# Patient Record
Sex: MALE | Race: BLACK OR AFRICAN AMERICAN | NOT HISPANIC OR LATINO | URBAN - METROPOLITAN AREA
[De-identification: names, ages, dates, MRNs, and addresses within clinical notes are randomized per-mention and may not be internally consistent; named-entity substitution may affect disease eponyms.]

---

## 2021-10-19 ENCOUNTER — INPATIENT (INPATIENT)
Facility: HOSPITAL | Age: 66
LOS: 1 days | Discharge: HOME | DRG: 69 | End: 2021-10-21
Attending: INTERNAL MEDICINE | Admitting: INTERNAL MEDICINE
Payer: MEDICARE

## 2021-10-19 VITALS
SYSTOLIC BLOOD PRESSURE: 138 MMHG | WEIGHT: 179.9 LBS | DIASTOLIC BLOOD PRESSURE: 77 MMHG | RESPIRATION RATE: 18 BRPM | HEART RATE: 96 BPM | TEMPERATURE: 98 F | OXYGEN SATURATION: 91 %

## 2021-10-19 DIAGNOSIS — E11.9 TYPE 2 DIABETES MELLITUS WITHOUT COMPLICATIONS: ICD-10-CM

## 2021-10-19 DIAGNOSIS — Z29.9 ENCOUNTER FOR PROPHYLACTIC MEASURES, UNSPECIFIED: ICD-10-CM
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
## 2021-10-19 DIAGNOSIS — I10 ESSENTIAL (PRIMARY) HYPERTENSION: ICD-10-CM

## 2021-10-19 DIAGNOSIS — F17.200 NICOTINE DEPENDENCE, UNSPECIFIED, UNCOMPLICATED: ICD-10-CM

## 2021-10-19 DIAGNOSIS — G93.49 OTHER ENCEPHALOPATHY: ICD-10-CM

## 2021-10-19 DIAGNOSIS — Z92.89 PERSONAL HISTORY OF OTHER MEDICAL TREATMENT: ICD-10-CM

## 2021-10-19 LAB
ALBUMIN SERPL ELPH-MCNC: 3.6 G/DL — SIGNIFICANT CHANGE UP (ref 3.4–5)
ALP SERPL-CCNC: 102 U/L — SIGNIFICANT CHANGE UP (ref 40–120)
ALT FLD-CCNC: 21 U/L — SIGNIFICANT CHANGE UP (ref 12–42)
ANION GAP SERPL CALC-SCNC: 5 MMOL/L — LOW (ref 9–16)
APPEARANCE UR: CLEAR — SIGNIFICANT CHANGE UP
AST SERPL-CCNC: 14 U/L — LOW (ref 15–37)
BASOPHILS # BLD AUTO: 0.06 K/UL — SIGNIFICANT CHANGE UP (ref 0–0.2)
BASOPHILS NFR BLD AUTO: 0.7 % — SIGNIFICANT CHANGE UP (ref 0–2)
BILIRUB SERPL-MCNC: 0.3 MG/DL — SIGNIFICANT CHANGE UP (ref 0.2–1.2)
BILIRUB UR-MCNC: NEGATIVE — SIGNIFICANT CHANGE UP
BUN SERPL-MCNC: 11 MG/DL — SIGNIFICANT CHANGE UP (ref 7–23)
CALCIUM SERPL-MCNC: 9.4 MG/DL — SIGNIFICANT CHANGE UP (ref 8.5–10.5)
CHLORIDE SERPL-SCNC: 110 MMOL/L — HIGH (ref 96–108)
CK SERPL-CCNC: 74 U/L — SIGNIFICANT CHANGE UP (ref 39–308)
CO2 SERPL-SCNC: 29 MMOL/L — SIGNIFICANT CHANGE UP (ref 22–31)
COLOR SPEC: YELLOW — SIGNIFICANT CHANGE UP
CREAT SERPL-MCNC: 1.21 MG/DL — SIGNIFICANT CHANGE UP (ref 0.5–1.3)
DIFF PNL FLD: NEGATIVE — SIGNIFICANT CHANGE UP
EOSINOPHIL # BLD AUTO: 0.02 K/UL — SIGNIFICANT CHANGE UP (ref 0–0.5)
EOSINOPHIL NFR BLD AUTO: 0.2 % — SIGNIFICANT CHANGE UP (ref 0–6)
GLUCOSE SERPL-MCNC: 103 MG/DL — HIGH (ref 70–99)
GLUCOSE UR QL: NEGATIVE — SIGNIFICANT CHANGE UP
HCT VFR BLD CALC: 48 % — SIGNIFICANT CHANGE UP (ref 39–50)
HGB BLD-MCNC: 15 G/DL — SIGNIFICANT CHANGE UP (ref 13–17)
IMM GRANULOCYTES NFR BLD AUTO: 0.3 % — SIGNIFICANT CHANGE UP (ref 0–1.5)
KETONES UR-MCNC: NEGATIVE — SIGNIFICANT CHANGE UP
LEUKOCYTE ESTERASE UR-ACNC: NEGATIVE — SIGNIFICANT CHANGE UP
LYMPHOCYTES # BLD AUTO: 1.84 K/UL — SIGNIFICANT CHANGE UP (ref 1–3.3)
LYMPHOCYTES # BLD AUTO: 21.4 % — SIGNIFICANT CHANGE UP (ref 13–44)
MANUAL SMEAR VERIFICATION: SIGNIFICANT CHANGE UP
MCHC RBC-ENTMCNC: 27 PG — SIGNIFICANT CHANGE UP (ref 27–34)
MCHC RBC-ENTMCNC: 31.3 GM/DL — LOW (ref 32–36)
MCV RBC AUTO: 86.5 FL — SIGNIFICANT CHANGE UP (ref 80–100)
MONOCYTES # BLD AUTO: 0.74 K/UL — SIGNIFICANT CHANGE UP (ref 0–0.9)
MONOCYTES NFR BLD AUTO: 8.6 % — SIGNIFICANT CHANGE UP (ref 2–14)
NEUTROPHILS # BLD AUTO: 5.89 K/UL — SIGNIFICANT CHANGE UP (ref 1.8–7.4)
NEUTROPHILS NFR BLD AUTO: 68.8 % — SIGNIFICANT CHANGE UP (ref 43–77)
NITRITE UR-MCNC: NEGATIVE — SIGNIFICANT CHANGE UP
NRBC # BLD: 0 /100 WBCS — SIGNIFICANT CHANGE UP (ref 0–0)
PH UR: 5.5 — SIGNIFICANT CHANGE UP (ref 5–8)
PLAT MORPH BLD: NORMAL — SIGNIFICANT CHANGE UP
PLATELET # BLD AUTO: 137 K/UL — LOW (ref 150–400)
POTASSIUM SERPL-MCNC: 4.1 MMOL/L — SIGNIFICANT CHANGE UP (ref 3.5–5.3)
POTASSIUM SERPL-SCNC: 4.1 MMOL/L — SIGNIFICANT CHANGE UP (ref 3.5–5.3)
PROT SERPL-MCNC: 7.3 G/DL — SIGNIFICANT CHANGE UP (ref 6.4–8.2)
PROT UR-MCNC: NEGATIVE MG/DL — SIGNIFICANT CHANGE UP
RBC # BLD: 5.55 M/UL — SIGNIFICANT CHANGE UP (ref 4.2–5.8)
RBC # FLD: 14.6 % — HIGH (ref 10.3–14.5)
RBC BLD AUTO: NORMAL — SIGNIFICANT CHANGE UP
SARS-COV-2 RNA SPEC QL NAA+PROBE: SIGNIFICANT CHANGE UP
SODIUM SERPL-SCNC: 144 MMOL/L — SIGNIFICANT CHANGE UP (ref 132–145)
SP GR SPEC: <=1.005 — SIGNIFICANT CHANGE UP (ref 1–1.03)
TROPONIN I, HIGH SENSITIVITY RESULT: 16 NG/L — SIGNIFICANT CHANGE UP
UROBILINOGEN FLD QL: 0.2 E.U./DL — SIGNIFICANT CHANGE UP
WBC # BLD: 8.58 K/UL — SIGNIFICANT CHANGE UP (ref 3.8–10.5)
WBC # FLD AUTO: 8.58 K/UL — SIGNIFICANT CHANGE UP (ref 3.8–10.5)

## 2021-10-19 PROCEDURE — 99223 1ST HOSP IP/OBS HIGH 75: CPT | Mod: GC

## 2021-10-19 PROCEDURE — 99285 EMERGENCY DEPT VISIT HI MDM: CPT

## 2021-10-19 PROCEDURE — 93010 ELECTROCARDIOGRAM REPORT: CPT

## 2021-10-19 PROCEDURE — 70450 CT HEAD/BRAIN W/O DYE: CPT | Mod: 26,MH

## 2021-10-19 RX ORDER — IPRATROPIUM/ALBUTEROL SULFATE 18-103MCG
3 AEROSOL WITH ADAPTER (GRAM) INHALATION EVERY 4 HOURS
Refills: 0 | Status: DISCONTINUED | OUTPATIENT
Start: 2021-10-19 | End: 2021-10-21

## 2021-10-19 RX ORDER — LANOLIN ALCOHOL/MO/W.PET/CERES
3 CREAM (GRAM) TOPICAL AT BEDTIME
Refills: 0 | Status: DISCONTINUED | OUTPATIENT
Start: 2021-10-19 | End: 2021-10-21

## 2021-10-19 RX ORDER — DEXTROSE 50 % IN WATER 50 %
15 SYRINGE (ML) INTRAVENOUS ONCE
Refills: 0 | Status: DISCONTINUED | OUTPATIENT
Start: 2021-10-19 | End: 2021-10-21

## 2021-10-19 RX ORDER — ACETAMINOPHEN 500 MG
650 TABLET ORAL EVERY 6 HOURS
Refills: 0 | Status: DISCONTINUED | OUTPATIENT
Start: 2021-10-19 | End: 2021-10-21

## 2021-10-19 RX ORDER — DEXTROSE 50 % IN WATER 50 %
25 SYRINGE (ML) INTRAVENOUS ONCE
Refills: 0 | Status: DISCONTINUED | OUTPATIENT
Start: 2021-10-19 | End: 2021-10-21

## 2021-10-19 RX ORDER — SODIUM CHLORIDE 9 MG/ML
1000 INJECTION, SOLUTION INTRAVENOUS
Refills: 0 | Status: DISCONTINUED | OUTPATIENT
Start: 2021-10-19 | End: 2021-10-21

## 2021-10-19 RX ORDER — INSULIN LISPRO 100/ML
VIAL (ML) SUBCUTANEOUS
Refills: 0 | Status: DISCONTINUED | OUTPATIENT
Start: 2021-10-19 | End: 2021-10-21

## 2021-10-19 RX ORDER — ONDANSETRON 8 MG/1
4 TABLET, FILM COATED ORAL EVERY 8 HOURS
Refills: 0 | Status: DISCONTINUED | OUTPATIENT
Start: 2021-10-19 | End: 2021-10-21

## 2021-10-19 NOTE — ED ADULT NURSE NOTE - NSIMPLEMENTINTERV_GEN_ALL_ED
Implemented All Fall Risk Interventions:  [place] to call system. Call bell, personal items and telephone within reach. Instruct patient to call for assistance. Room bathroom lighting operational. Non-slip footwear when patient is off stretcher. Physically safe environment: no spills, clutter or unnecessary equipment. Stretcher in lowest position, wheels locked, appropriate side rails in place. Provide visual cue, wrist band, yellow gown, etc. Monitor gait and stability. Monitor for mental status changes and reorient to person, place, and time. Review medications for side effects contributing to fall risk. Reinforce activity limits and safety measures with patient and family.

## 2021-10-19 NOTE — H&P ADULT - PROBLEM SELECTOR PLAN 7
See fall plan above  - repeat CTH  - PT F: none  E: replete K<4.0, Mg<2.0, Phos<2.5  N: soft/CC  DVT prophylaxis: pending repeat CTH  Access: PIV  Code Status: full

## 2021-10-19 NOTE — ED ADULT NURSE REASSESSMENT NOTE - NS ED NURSE REASSESS COMMENT FT1
Pt received from [facility]. Pt resting comfortably in bed. No s/s of acute distress. Will continue to monitor

## 2021-10-19 NOTE — H&P ADULT - ATTENDING COMMENTS
66 year old male patient with history DM, HTN, CVA, tobacco use, Syphilis/gonorrhea, unclear psych disorder is sent to [facility] ED by bystanders after a fall at [facility]. Patient is a poor historian he doesn't recall the details of the fall. In the ED patients CT Head showed R parietal attenuation old infarct vs beginning of bleed. patient is admitted for further monitoring of abnormal CT and evaluation for encephalopathy/AMS.    1, Abnormal CT  STAT CT head to r/o active bleed    2. Encephalopathy  unclear etiology  primary psych issue vs polypharmacy vs head trauma  monitor overnight  obtain collateral from psych

## 2021-10-19 NOTE — H&P ADULT - NSHPPHYSICALEXAM_GEN_ALL_CORE
VITAL SIGNS:  T(C): 36.7 (10-19-21 @ 22:07), Max: 37.1 (10-19-21 @ 17:14)  T(F): 98.1 (10-19-21 @ 22:07), Max: 98.7 (10-19-21 @ 17:14)  HR: 67 (10-19-21 @ 22:07) (65 - 96)  BP: 164/79 (10-19-21 @ 22:34) (138/77 - 186/94)  BP(mean): --  RR: 18 (10-19-21 @ 22:07) (18 - 18)  SpO2: 95% (10-19-21 @ 22:07) (91% - 97%)  Wt(kg): --    PHYSICAL EXAM:  Constitutional: calm appearing middle aged man resting comfortably in bed; NAD  Head: no lesions or abrasions appreciated  Eyes: PER, EOMI, anicteric sclera  ENT: no nasal discharge; uvula midline, no oropharyngeal erythema or exudates; MMM; edentulous   Neck: supple; no JVD or thyromegaly  Respiratory: diffuse wheezes in b/l bases, normal WOB without retractions  Cardiac: +S1/S2; RRR; no M/R/G;  Gastrointestinal: abdomen soft, NT/ND; no rebound or guarding  Extremities: WWP, no clubbing or cyanosis; no peripheral edema  Musculoskeletal: NROM x4; no joint swelling, tenderness or erythema  Vascular: 2+ radial, DP pulses B/L  Dermatologic: dry slight scaling on b/l feet and ankles  Neurologic; CNII-XII intact; 5/5 strength all distal extremities, difficulty with heel to shin b/l but able to track back of leg along knee; L hand resting tremor, no intentional tremor or tongue fasciculations  Psychiatric: affect and characteristics of appearance, verbalizations, behaviors are appropriate VITAL SIGNS:  T(C): 36.7 (10-19-21 @ 22:07), Max: 37.1 (10-19-21 @ 17:14)  T(F): 98.1 (10-19-21 @ 22:07), Max: 98.7 (10-19-21 @ 17:14)  HR: 67 (10-19-21 @ 22:07) (65 - 96)  BP: 164/79 (10-19-21 @ 22:34) (138/77 - 186/94)  BP(mean): --  RR: 18 (10-19-21 @ 22:07) (18 - 18)  SpO2: 95% (10-19-21 @ 22:07) (91% - 97%)  Wt(kg): --    PHYSICAL EXAM:  Constitutional: calm appearing middle aged man resting comfortably in bed; NAD  Head: no lesions or abrasions appreciated  Eyes: PER, EOMI, anicteric sclera  ENT: no nasal discharge; uvula midline, no oropharyngeal erythema or exudates; MMM; edentulous   Neck: supple; no JVD or thyromegaly  Respiratory: diffuse wheezes in b/l bases, normal WOB without retractions  Cardiac: +S1/S2; RRR; no M/R/G;  Gastrointestinal: abdomen soft, NT/ND; no rebound or guarding  Extremities: WWP, no clubbing or cyanosis; no peripheral edema  Musculoskeletal: NROM x4; no joint swelling, tenderness or erythema  Vascular: 2+ radial, DP pulses B/L  Dermatologic: dry slight scaling on b/l feet and ankles  Neurologic; [place], thought it was Wednesday October 2019; CNII-XII intact; 5/5 strength all distal extremities, difficulty with heel to shin b/l but able to track back of leg along knee; L hand resting tremor, no intentional tremor or tongue fasciculations  Psychiatric: affect and characteristics of appearance, verbalizations, behaviors are appropriate

## 2021-10-19 NOTE — H&P ADULT - PROBLEM SELECTOR PLAN 4
Patient says he has high blood pressure but doesn't recall what meds he takes. 138/77 in ED, then 176/70 on RMF but taken while patient lying on his R side. Per SureScripts on Nifedpine 60 and Toprol 100  - recheck BP supine, if elevated will empirically start nifedipine 60mg 1-2 times per day prn  - temporize with labetalol Patient says he has high blood pressure but doesn't recall what meds he takes. 138/77 in ED, then 176/70 on RMF but taken while patient lying on his R side. Per SureScripts on Nifedpine 60 and Toprol 100  - recheck BP supine, if elevated will empirically start nifedipine 60mg 1-2 times per day prn  - temporize with labetalol  - orthostatic vitals when working with PT History of DM, insulin on SureScripts from earlier this year but pt does not think he takes insulin.  - insulin sliding scale  - am A1C

## 2021-10-19 NOTE — ED ADULT NURSE NOTE - CHIEF COMPLAINT QUOTE
Pt BIBA from bathroom in [facility]. States his legs gave out and was unable to get back up fron the floor. No head strike or loc,

## 2021-10-19 NOTE — H&P ADULT - PROBLEM SELECTOR PLAN 5
Active smoker since childhood, did not desire counselling on tobacco cessation overnight. Likely component of reactive airway disease or COPD but pt does not think he has a formal diagnosis of it. He does recall taking inhaled medications but not every day.  - offer nicotine replacement  - counselling in AM  - duonebs prn for wheezing or desaturations  - tolerate SpO2 >88% given concern for underlying COPD Active smoker since childhood, did not desire counselling on tobacco cessation overnight. Likely component of reactive airway disease or COPD but pt does not think he has a formal diagnosis of it. He does recall taking inhaled medications but not every day.  - offer nicotine replacement  - counselling in AM  - duonebs prn for wheezing or desaturations  - tolerate SpO2 >88% given concern for underlying COPD  - AM CXR Patient says he has high blood pressure but doesn't recall what meds he takes. 138/77 in ED, then 176/70 on RMF but taken while patient lying on his R side. Per SureScripts on Nifedpine 60 and Toprol 100  - recheck BP supine, if elevated will empirically start nifedipine 60mg 1-2 times per day prn  - temporize with labetalol  - orthostatic vitals when working with PT

## 2021-10-19 NOTE — H&P ADULT - HISTORY OF PRESENT ILLNESS
66 M with PMHx DM, HTN, CVA, tobacco use, Syphilis/gonorrhea, unclear psych disorder presents after a fall at [facility]. Patient states that the day prior to admission he was in his usual state of health; the morning of admission he noted slight R leg weakness. He remembers travelling to [facility] but does not recall why. He then noted no lightheadedness, but sudden onset weakness in his legs; he attempted to ambulate to the bathroom but fell backwards including hitting the back of his head. He did not lose consciousness or note any blood. No headache, vertigo, changes in vision, changes in cognition.  Per collateral in ED provider note, pt lives in a community residence for psychiatric patients. Treated by Enoch Em.  [facility], clonazepam, atorvastatin, plavix  Per SureScripts, pt on trazodone, olanzipine, donepezil, nifedipine, Toprol, metformin    ED: 36.9, 96, 138/77, 18, 91% on RA 66 M with PMHx DM, HTN, CVA, tobacco use, Syphilis/gonorrhea, unclear psych disorder presents after a fall at [facility]. Patient states that the day prior to admission he was in his usual state of health; the morning of admission he noted slight R leg weakness. He remembers travelling to [facility] but does not recall why. He then noted no lightheadedness, but sudden onset weakness in his legs; he attempted to ambulate to the bathroom but fell backwards including hitting the back of his head. He did not lose consciousness or note any blood. No headache, vertigo, changes in vision, changes in cognition.  ROS positive for dry cough for days to weeks, otherwise ROS negative  Per collateral in ED provider note, pt lives in a community residence for psychiatric patients. Treated by Enoch Em.  Patient states that he manages his own meds without assistance and takes some in the morning some at night but doesn't remember any names. Thinks he uses an inhaler but does not inject insulin.  [facility] metformin, clonazepam, atorvastatin, plavix  Per SureScripts, pt on trazodone, olanzipine, donepezil, nifedipine, Toprol, metformin    ED: 36.9, 96, 138/77, 18, 91% on RA  EKmm STEVE V1-3, borderline LVH  CTH R parietal attenuation old infarct vs beginning of bleed  CBC, CMP, UA unrevealing 66 M with PMHx DM, HTN, CVA, tobacco use, Syphilis/gonorrhea, unclear psych disorder presents after a fall at [facility]. Patient states that the day prior to admission he was in his usual state of health; the morning of admission he noted slight R leg weakness. He remembers travelling to [facility] but does not recall why. He then noted no lightheadedness, but sudden onset weakness in his legs; he attempted to ambulate to the bathroom but fell backwards including hitting the back of his head. He did not lose consciousness or note any blood. No headache, vertigo, changes in vision, changes in cognition.  ROS positive for dry cough for days to weeks, otherwise ROS negative  Per collateral in ED provider note, pt lives in a community residence for psychiatric patients. Treated by Enoch Em.  Patient states that he manages his own meds without assistance and takes some in the morning some at night but doesn't remember any names. Thinks he uses an inhaler but does not inject insulin.  [facility] metformin, clonazepam, atorvastatin, plavix  Per SureScripts, pt on trazodone, olanzipine, donepezil, nifedipine, Toprol, metformin    ED: 36.9, 96, 138/77, 18, 91% on RA  EKmm STEVE V1-3, borderline LVH  CTH R parietal attenuation old infarct vs beginning of bleed  CBC, CMP, UA unrevealing; borderline Cr 1.21

## 2021-10-19 NOTE — H&P ADULT - PROBLEM SELECTOR PLAN 1
Fall at [facility] and patient having difficulty answering historical questions. Unclear if patient is altered in setting of concussion or at baseline has poor health literacy. Patient reporting R leg weakness prior to fall then acute onset b/l weakness, found in bathroom. Ddx includes TIA/CVA, orthostatic hypotension 2/2 dehydration or medications, mechanical fall, unsteadiness 2/2 antipsychotic medications, or transient event intoxication. ACS low on differential given not meeting criteria on EKG, no chest pain, no trop in ED, but EKG did have sub-2mm STEVE in V1-3.  - Urine toxicology  - trend EKG  - repeat head CT to confirm stability  - f/u AM TSH, a1c, B12  - PT consultation  - med rec and psych collateral in AM, attempted to call Enoch Em with no answer overnight

## 2021-10-19 NOTE — ED PROVIDER NOTE - CLINICAL SUMMARY MEDICAL DECISION MAKING FREE TEXT BOX
65 y/o M is BIB EMS after a presumed fall in the setting of his legs giving out while at the [facility] bathroom. No evidence of trauma. Given unknown LOC, will order CT head. EKG shows no acute ischemia. Blood sugar is normal. Pt presumed to have been seen at [facility] at some point given the proximity to his home address. Spoke with [facility] ED doctor and got contact information for Juan Kevin [[phone]] who was found to work at the residence that Pt lives in. Was unable to speak to Mr. Kevin but found out that Pt lives in a community/residence for patients with psych issues. Pt psychiatrist is Dr. Enoch Em [[phone]]. Attempted to call to speak with care manager at residency but no one was there at the time. Was also able to find out from [facility] regarding his PMH of diabetes mellitus, hypertension, and CVA without residual deficits. Pt currently on Metformin, Clonazepam, Atorvastatin, and Plavix. Pt will need at least a physical therapist to evaluate him to make sure he is safe for discharge. Given that Pt is on blood thinners, Pt will need repeat head CT scan done in 4-6 hours.

## 2021-10-19 NOTE — H&P ADULT - PROBLEM SELECTOR PLAN 6
F: none  E: replete K<4.0, Mg<2.0, Phos<2.5  N: soft/CC  DVT prophylaxis: pending repeat CTH  Access: PIV  Code Status: full Active smoker since childhood, did not desire counselling on tobacco cessation overnight. Likely component of reactive airway disease or COPD but pt does not think he has a formal diagnosis of it. He does recall taking inhaled medications but not every day.  - offer nicotine replacement  - counselling in AM  - duonebs prn for wheezing or desaturations  - tolerate SpO2 >88% given concern for underlying COPD  - AM CXR

## 2021-10-19 NOTE — H&P ADULT - ASSESSMENT
66 M with PMHx HTN, DM, CVA, prior syphilis/gonorrhea, unspecified psychosis BIBEMS after fall admitted for ongoing evaluation.

## 2021-10-19 NOTE — H&P ADULT - NSHPLABSRESULTS_GEN_ALL_CORE
LABS:                         15.0   8.58  )-----------( 137      ( 19 Oct 2021 16:30 )             48.0     10-19    144  |  110<H>  |  11  ----------------------------<  103<H>  4.1   |  29  |  1.21    Ca    9.4      19 Oct 2021 16:30    TPro  7.3  /  Alb  3.6  /  TBili  0.3  /  DBili  x   /  AST  14<L>  /  ALT  21  /  AlkPhos  102  10-19      Urinalysis Basic - ( 19 Oct 2021 19:20 )    Color: Yellow / Appearance: Clear / SG: <=1.005 / pH: x  Gluc: x / Ketone: NEGATIVE  / Bili: NEGATIVE / Urobili: 0.2 E.U./dL   Blood: x / Protein: NEGATIVE mg/dL / Nitrite: NEGATIVE   Leuk Esterase: NEGATIVE / RBC: x / WBC x   Sq Epi: x / Non Sq Epi: x / Bacteria: x      CARDIAC MARKERS ( 19 Oct 2021 16:30 )  x     / x     / 74 U/L / x     / x                RADIOLOGY, EKG & ADDITIONAL TESTS: Reviewed.

## 2021-10-19 NOTE — H&P ADULT - PROBLEM SELECTOR PLAN 3
History of DM, insulin on SureScripts from earlier this year but pt does not think he takes insulin.  - insulin sliding scale  - am A1C Per SureScripts on trazodone 50, olanzipine 20, donepezil 10. Per [facility] collateral via [facility] ED only on clonazepam.  - psych collateral  - if patient agitated, prefer olanzipine 10mg disintegrating tabs. Avoid olanzipine IM/IV as cannot give lorazepam concurrently and patient could withdraw from clonazepam  - low risk CIWA due to outpatient benzo use

## 2021-10-19 NOTE — ED ADULT NURSE NOTE - OBJECTIVE STATEMENT
Pt is a 66y male BIBEMS from [facility] for fall. PT states that he was in the bathroom at [facility] when his "legs gave out" and he fell. Pt states that he did hit his head. Denies loc. Unknown use of blood thinners. PT AAOX3 at this time but unable to recall medications. Pt states that he has a home health aid ([facility]) M,W,F. Pt states that he uses a cane/walker at home but did not have it with him. PT came to the city to look for [employer] music. No obvious signs of injury noted. PT moving all extremities. Call bell with in reach. Bed alarm on. PAIN SCALE 4 OF 10.

## 2021-10-19 NOTE — ED PROVIDER NOTE - NSICDXPASTMEDICALHX_GEN_ALL_CORE_FT
PAST MEDICAL HISTORY:  Diabetes mellitus      PAST MEDICAL HISTORY:  Diabetes mellitus     HTN (hypertension)

## 2021-10-19 NOTE — H&P ADULT - NSHPSOCIALHISTORY_GEN_ALL_CORE
Patient states that he lives alone in an apartment in [place], but per ED note lives in group home (tati Kevin [[phone]])  Active tobacco user 1ppd since teenage years  denies any alcohol or drug use  does not remember last sexual activity, sexually active with women previously  does not work

## 2021-10-19 NOTE — ED PROVIDER NOTE - OBJECTIVE STATEMENT
67 y/o M with PMHx of diabetes mellitus presents to the ED via EMS for evaluation. Pt reports he was in [facility] earlier today searching for [email or web address] music to purchase. [facility] [place] [facility] when his "legs gave out." Pt states he fell and hit his head but denies LOC or any pain. When asked, Pt states he has a walker and cane at home but he did not bring it with him today. EMS was called by bystander and Pt was brought to the ED for evaluation. He does not have any identification with him. He is able to state his address and the name of his home health aide [Benita]. Pt states he has had similar episodes in the past but he does not remember the circumstances. Pt states he does not have family or friends. 67 y/o M with PMHx of diabetes mellitus presents to the ED via EMS for evaluation. Pt reports he was in [facility] earlier today searching for [employer] music to purchase. [place] [facility] when his "legs gave out." Pt states he fell and hit his head but denies LOC or any pain. When asked, Pt states he has a walker and cane at home but he did not bring it with him today. EMS was called by bystander and Pt was brought to the ED for evaluation. He does not have any identification with him. He is able to state his address and the name of his home health aide [Benita; Pt has coverage 3 days a week]. Pt states he has had similar episodes in the past but he does not remember the circumstances. Pt states he does not have family or friends. 67 y/o M with PMHx of diabetes mellitus and hypertension [Pt unsure of medications] presents to the ED via EMS for evaluation. EMS was called after bystanders found him alert and oriented in the [facility] bathroom. Pt states his legs gave out which caused him to fall. It is unclear if Pt hit his head. Pt is somewhat of a poor historian. Pt states he lives in [place] and knows his address but does not have a wallet with him. Pt went to the [facility] bus terminal via [address] and he brought cash with him. He has a home health aide who is not present on Tuesday's. He may or may not have walked with a cane or walker but later confirms he usually does but not all the time. He did not have a walker or cane with him. Pt states he is feeling fine and has had prior episodes where his legs will give out similar to today. Pt denies pain, recent illness, LOC, or bowel/bladder incontinence. His phone number is unknown and Pt does not have a phone with him. Pt does not have any family or friends.

## 2021-10-19 NOTE — H&P ADULT - NSHPREVIEWOFSYSTEMS_GEN_ALL_CORE
REVIEW OF SYSTEMS:  CONSTITUTIONAL: No weakness, fevers, chills, changes in weight  EYES/ENT: No visual changes;  No vertigo or throat pain   NECK: No pain or stiffness  RESPIRATORY: + cough, no wheezing, hemoptysis; No shortness of breath  CARDIOVASCULAR: no chest pain or palpitations  GASTROINTESTINAL: No abdominal or epigastric pain. No nausea, vomiting, or hematemesis; No diarrhea or constipation. No melena or hematochezia.  GENITOURINARY: No dysuria, frequency or hematuria  NEUROLOGICAL: No numbness or weakness  SKIN: No itching, rashes

## 2021-10-19 NOTE — H&P ADULT - PROBLEM SELECTOR PLAN 2
Per SureScripts on trazodone 50, olanzipine 20, donepezil 10. Per [facility] collateral via [facility] ED only on clonazepam.  - psych collateral  - if patient agitated, prefer olanzipine 10mg disintegrating tabs. Avoid olanzipine IM/IV as cannot give lorazepam concurrently and patient could withdraw from clonazepam  - low risk CIWA due to outpatient benzo use Broad differential at this time. Firstly, it is unclear if patient is off from baseline as per SureScripts is on donepezil so may have dementia vs chronic AMS secondary to unspecified psychiatric disorder.  If he is in fact acutely altered, potential etiologies include ischemic from TIA/CVA, traumatic from concussion from fall, toxic from illicit drugs or from polypsychopharmacy with trazodone, olanzipine, donepezil. Unlikely metabolic given normal labs.  - f/u CT, utox, AM labs  - consider psychiatry or neuro consultation after getting collateral Broad differential at this time. Firstly, it is unclear if patient is off from baseline as per SureScripts is on donepezil so may have dementia vs chronic AMS secondary to unspecified psychiatric disorder.  If he is in fact acutely altered, potential etiologies include ischemic from TIA/CVA, traumatic from concussion from fall, toxic from illicit drugs or from polypharmacy with trazodone, olanzipine, donepezil. Unlikely metabolic given normal labs.  - f/u CT, utox, AM labs  - consider psychiatry or neuro consultation after getting collateral

## 2021-10-19 NOTE — ED ADULT TRIAGE NOTE - CHIEF COMPLAINT QUOTE
Pt BIBA from bathroom in [facility]. States his legs gave out and was unable to get back up fron the floor. No head strike or loc,

## 2021-10-20 DIAGNOSIS — Z86.73 PERSONAL HISTORY OF TRANSIENT ISCHEMIC ATTACK (TIA), AND CEREBRAL INFARCTION WITHOUT RESIDUAL DEFICITS: ICD-10-CM

## 2021-10-20 DIAGNOSIS — Z86.59 PERSONAL HISTORY OF OTHER MENTAL AND BEHAVIORAL DISORDERS: ICD-10-CM

## 2021-10-20 LAB
A1C WITH ESTIMATED AVERAGE GLUCOSE RESULT: 6.1 % — HIGH (ref 4–5.6)
AMPHET UR-MCNC: NEGATIVE — SIGNIFICANT CHANGE UP
ANION GAP SERPL CALC-SCNC: 10 MMOL/L — SIGNIFICANT CHANGE UP (ref 5–17)
BARBITURATES UR SCN-MCNC: NEGATIVE — SIGNIFICANT CHANGE UP
BENZODIAZ UR-MCNC: NEGATIVE — SIGNIFICANT CHANGE UP
BLD GP AB SCN SERPL QL: NEGATIVE — SIGNIFICANT CHANGE UP
BUN SERPL-MCNC: 10 MG/DL — SIGNIFICANT CHANGE UP (ref 7–23)
CALCIUM SERPL-MCNC: 10.2 MG/DL — SIGNIFICANT CHANGE UP (ref 8.4–10.5)
CHLORIDE SERPL-SCNC: 115 MMOL/L — HIGH (ref 96–108)
CO2 SERPL-SCNC: 24 MMOL/L — SIGNIFICANT CHANGE UP (ref 22–31)
COCAINE METAB.OTHER UR-MCNC: NEGATIVE — SIGNIFICANT CHANGE UP
COVID-19 SPIKE DOMAIN AB INTERP: POSITIVE
COVID-19 SPIKE DOMAIN ANTIBODY RESULT: >250 U/ML — HIGH
CREAT SERPL-MCNC: 0.94 MG/DL — SIGNIFICANT CHANGE UP (ref 0.5–1.3)
ESTIMATED AVERAGE GLUCOSE: 128 MG/DL — HIGH (ref 68–114)
GLUCOSE BLDC GLUCOMTR-MCNC: 104 MG/DL — HIGH (ref 70–99)
GLUCOSE BLDC GLUCOMTR-MCNC: 115 MG/DL — HIGH (ref 70–99)
GLUCOSE BLDC GLUCOMTR-MCNC: 85 MG/DL — SIGNIFICANT CHANGE UP (ref 70–99)
GLUCOSE BLDC GLUCOMTR-MCNC: 96 MG/DL — SIGNIFICANT CHANGE UP (ref 70–99)
GLUCOSE SERPL-MCNC: 104 MG/DL — HIGH (ref 70–99)
HCT VFR BLD CALC: 54.7 % — HIGH (ref 39–50)
HCV AB S/CO SERPL IA: 0.04 S/CO — SIGNIFICANT CHANGE UP
HCV AB SERPL-IMP: SIGNIFICANT CHANGE UP
HGB BLD-MCNC: 16.6 G/DL — SIGNIFICANT CHANGE UP (ref 13–17)
HIV 1+2 AB+HIV1 P24 AG SERPL QL IA: SIGNIFICANT CHANGE UP
MAGNESIUM SERPL-MCNC: 2.2 MG/DL — SIGNIFICANT CHANGE UP (ref 1.6–2.6)
MCHC RBC-ENTMCNC: 26.7 PG — LOW (ref 27–34)
MCHC RBC-ENTMCNC: 30.3 GM/DL — LOW (ref 32–36)
MCV RBC AUTO: 88.1 FL — SIGNIFICANT CHANGE UP (ref 80–100)
METHADONE UR-MCNC: NEGATIVE — SIGNIFICANT CHANGE UP
NRBC # BLD: 0 /100 WBCS — SIGNIFICANT CHANGE UP (ref 0–0)
OPIATES UR-MCNC: NEGATIVE — SIGNIFICANT CHANGE UP
PCP SPEC-MCNC: SIGNIFICANT CHANGE UP
PCP UR-MCNC: NEGATIVE — SIGNIFICANT CHANGE UP
PHOSPHATE SERPL-MCNC: 3.3 MG/DL — SIGNIFICANT CHANGE UP (ref 2.5–4.5)
PLATELET # BLD AUTO: 173 K/UL — SIGNIFICANT CHANGE UP (ref 150–400)
POTASSIUM SERPL-MCNC: 4.2 MMOL/L — SIGNIFICANT CHANGE UP (ref 3.5–5.3)
POTASSIUM SERPL-SCNC: 4.2 MMOL/L — SIGNIFICANT CHANGE UP (ref 3.5–5.3)
RBC # BLD: 6.21 M/UL — HIGH (ref 4.2–5.8)
RBC # FLD: 15.1 % — HIGH (ref 10.3–14.5)
RH IG SCN BLD-IMP: POSITIVE — SIGNIFICANT CHANGE UP
SARS-COV-2 IGG+IGM SERPL QL IA: >250 U/ML — HIGH
SARS-COV-2 IGG+IGM SERPL QL IA: POSITIVE
SODIUM SERPL-SCNC: 149 MMOL/L — HIGH (ref 135–145)
THC UR QL: NEGATIVE — SIGNIFICANT CHANGE UP
TSH SERPL-MCNC: 1.26 UIU/ML — SIGNIFICANT CHANGE UP (ref 0.27–4.2)
VIT B12 SERPL-MCNC: 1591 PG/ML — HIGH (ref 232–1245)
WBC # BLD: 8.9 K/UL — SIGNIFICANT CHANGE UP (ref 3.8–10.5)
WBC # FLD AUTO: 8.9 K/UL — SIGNIFICANT CHANGE UP (ref 3.8–10.5)

## 2021-10-20 PROCEDURE — 99232 SBSQ HOSP IP/OBS MODERATE 35: CPT | Mod: GC

## 2021-10-20 PROCEDURE — 70450 CT HEAD/BRAIN W/O DYE: CPT | Mod: 26

## 2021-10-20 PROCEDURE — 71045 X-RAY EXAM CHEST 1 VIEW: CPT | Mod: 26

## 2021-10-20 RX ORDER — ASPIRIN/CALCIUM CARB/MAGNESIUM 324 MG
81 TABLET ORAL EVERY 24 HOURS
Refills: 0 | Status: DISCONTINUED | OUTPATIENT
Start: 2021-10-20 | End: 2021-10-21

## 2021-10-20 RX ORDER — NIFEDIPINE 30 MG
60 TABLET, EXTENDED RELEASE 24 HR ORAL EVERY 24 HOURS
Refills: 0 | Status: DISCONTINUED | OUTPATIENT
Start: 2021-10-20 | End: 2021-10-21

## 2021-10-20 RX ORDER — INFLUENZA VIRUS VACCINE 15; 15; 15; 15 UG/.5ML; UG/.5ML; UG/.5ML; UG/.5ML
0.7 SUSPENSION INTRAMUSCULAR ONCE
Refills: 0 | Status: DISCONTINUED | OUTPATIENT
Start: 2021-10-20 | End: 2021-10-21

## 2021-10-20 RX ORDER — NIFEDIPINE 30 MG
60 TABLET, EXTENDED RELEASE 24 HR ORAL ONCE
Refills: 0 | Status: COMPLETED | OUTPATIENT
Start: 2021-10-20 | End: 2021-10-20

## 2021-10-20 RX ORDER — CLOPIDOGREL BISULFATE 75 MG/1
75 TABLET, FILM COATED ORAL EVERY 24 HOURS
Refills: 0 | Status: DISCONTINUED | OUTPATIENT
Start: 2021-10-20 | End: 2021-10-21

## 2021-10-20 RX ORDER — DONEPEZIL HYDROCHLORIDE 10 MG/1
10 TABLET, FILM COATED ORAL AT BEDTIME
Refills: 0 | Status: DISCONTINUED | OUTPATIENT
Start: 2021-10-20 | End: 2021-10-21

## 2021-10-20 RX ORDER — INFLUENZA VIRUS VACCINE 15; 15; 15; 15 UG/.5ML; UG/.5ML; UG/.5ML; UG/.5ML
0.5 SUSPENSION INTRAMUSCULAR ONCE
Refills: 0 | Status: DISCONTINUED | OUTPATIENT
Start: 2021-10-20 | End: 2021-10-20

## 2021-10-20 RX ORDER — PREGABALIN 225 MG/1
1000 CAPSULE ORAL EVERY 24 HOURS
Refills: 0 | Status: DISCONTINUED | OUTPATIENT
Start: 2021-10-20 | End: 2021-10-20

## 2021-10-20 RX ORDER — ATORVASTATIN CALCIUM 80 MG/1
40 TABLET, FILM COATED ORAL AT BEDTIME
Refills: 0 | Status: DISCONTINUED | OUTPATIENT
Start: 2021-10-20 | End: 2021-10-21

## 2021-10-20 RX ADMIN — CLOPIDOGREL BISULFATE 75 MILLIGRAM(S): 75 TABLET, FILM COATED ORAL at 19:18

## 2021-10-20 RX ADMIN — DONEPEZIL HYDROCHLORIDE 10 MILLIGRAM(S): 10 TABLET, FILM COATED ORAL at 22:10

## 2021-10-20 RX ADMIN — Medication 60 MILLIGRAM(S): at 01:06

## 2021-10-20 RX ADMIN — Medication 60 MILLIGRAM(S): at 11:25

## 2021-10-20 RX ADMIN — ATORVASTATIN CALCIUM 40 MILLIGRAM(S): 80 TABLET, FILM COATED ORAL at 22:10

## 2021-10-20 RX ADMIN — Medication 81 MILLIGRAM(S): at 19:18

## 2021-10-20 NOTE — PROGRESS NOTE ADULT - PROBLEM SELECTOR PLAN 3
Pt seems to have speech latency and poverty of content, likely negative sx schizophrenia. Endorses hx of hearing voices, not hearing voices at present. Denies any current or prior hx of suicidal ideation. Per SureScripts on trazodone 50, olanzipine 20, donepezil 10. Per [facility] collateral via [facility] ED only on clonazepam. Per collateral, only on donepezil     - if patient agitated, prefer olanzipine 10mg disintegrating tabs. Avoid olanzipine IM/IV as cannot give lorazepam concurrently and patient could withdraw from clonazepam  - low risk CIWA due to outpatient benzo use spoke with  Lona Hamilton at [facility] #: [phone]), Mr. Wong is demented at baseline, wears diapers, and carries a dx of schizophrenia; Pt seems to have speech latency and poverty of content, likely negative sx schizophrenia. Endorses hx of hearing voices, not hearing voices at present. Denies any current or prior hx of suicidal ideation. Per SureScripts on trazodone 50, olanzipine 20, donepezil 10. Per [facility] collateral via [facility] ED only on clonazepam.     - if patient agitated, prefer olanzipine 10mg disintegrating tabs. Avoid olanzipine IM/IV as cannot give lorazepam concurrently and patient could withdraw from clonazepam  - low risk CIWA due to outpatient benzo use

## 2021-10-20 NOTE — PHYSICAL THERAPY INITIAL EVALUATION ADULT - ADDITIONAL COMMENTS
Pt lives in a group home with no steps to enter. Pt reports to use SC and RW occasionally to ambulate

## 2021-10-20 NOTE — PHYSICAL THERAPY INITIAL EVALUATION ADULT - PERTINENT HX OF CURRENT PROBLEM, REHAB EVAL
66 M with PMHx DM, HTN, CVA, tobacco use, Syphilis/gonorrhea, unclear psych disorder presents after a fall at . Patient states that the day prior to admission he was in his usual state of health; the morning of admission he noted slight R leg weakness. He remembers travelling to  but does not recall why. Refer to h&p for full details.

## 2021-10-20 NOTE — CONSULT NOTE ADULT - SUBJECTIVE AND OBJECTIVE BOX
Patient is a 66y old  Male who presents with a chief complaint of Fall (20 Oct 2021 11:35)       HPI:  66 M with PMHx DM, HTN, CVA, tobacco use, Syphilis/gonorrhea, unclear psych disorder presents after a fall at [facility]. Patient states that the day prior to admission he was in his usual state of health; the morning of admission he noted slight R leg weakness. He remembers travelling to [facility] but does not recall why. He then noted no lightheadedness, but sudden onset weakness in his legs; he attempted to ambulate to the bathroom but fell backwards including hitting the back of his head. He did not lose consciousness or note any blood. No headache, vertigo, changes in vision, changes in cognition.  ROS positive for dry cough for days to weeks, otherwise ROS negative  Per collateral in ED provider note, pt lives in a community residence for psychiatric patients. Treated by [facility].  Patient states that he manages his own meds without assistance and takes some in the morning some at night but doesn't remember any names. Thinks he uses an inhaler but does not inject insulin.  Per collateral from [facility]: on metformin, clonazepam, atorvastatin, plavix  Per [facility], pt on trazodone, olanzipine, donepezil, nifedipine, Toprol, metformin    ED: 36.9, 96, 138/77, 18, 91% on RA  EKmm STEVE V1-3, borderline LVH  CTH R parietal attenuation old infarct vs beginning of bleed  CBC, CMP, UA unrevealing; borderline Cr 1.21 (19 Oct 2021 22:45)      PAST MEDICAL & SURGICAL HISTORY:  Diabetes mellitus    HTN (hypertension)        MEDICATIONS  (STANDING):  dextrose 40% Gel 15 Gram(s) Oral once  dextrose 5%. 1000 milliLiter(s) (50 mL/Hr) IV Continuous <Continuous>  dextrose 50% Injectable 25 Gram(s) IV Push once  influenza  Vaccine (HIGH DOSE) 0.7 milliLiter(s) IntraMuscular once  insulin lispro (ADMELOG) corrective regimen sliding scale   SubCutaneous three times a day before meals  NIFEdipine XL 60 milliGRAM(s) Oral every 24 hours    MEDICATIONS  (PRN):  acetaminophen   Tablet .. 650 milliGRAM(s) Oral every 6 hours PRN Temp greater or equal to 38C (100.4F), Mild Pain (1 - 3)  albuterol/ipratropium for Nebulization 3 milliLiter(s) Nebulizer every 4 hours PRN bronchospasm OR SpO2<88%  aluminum hydroxide/magnesium hydroxide/simethicone Suspension 30 milliLiter(s) Oral every 4 hours PRN Dyspepsia  LORazepam     Tablet 1 milliGRAM(s) Oral every 1 hour PRN CIWA-Ar score 8 or greater  melatonin 3 milliGRAM(s) Oral at bedtime PRN Insomnia  ondansetron Injectable 4 milliGRAM(s) IV Push every 8 hours PRN Nausea and/or Vomiting    FAMILY HISTORY:      CBC Full  -  ( 20 Oct 2021 08:20 )  WBC Count : 8.90 K/uL  RBC Count : 6.21 M/uL  Hemoglobin : 16.6 g/dL  Hematocrit : 54.7 %  Platelet Count - Automated : 173 K/uL  Mean Cell Volume : 88.1 fl  Mean Cell Hemoglobin : 26.7 pg  Mean Cell Hemoglobin Concentration : 30.3 gm/dL  Auto Neutrophil # : x  Auto Lymphocyte # : x  Auto Monocyte # : x  Auto Eosinophil # : x  Auto Basophil # : x  Auto Neutrophil % : x  Auto Lymphocyte % : x  Auto Monocyte % : x  Auto Eosinophil % : x  Auto Basophil % : x      10-20    149<H>  |  115<H>  |  10  ----------------------------<  104<H>  4.2   |  24  |  0.94    Ca    10.2      20 Oct 2021 08:20  Phos  3.3     10-20  Mg     2.2     10-20    TPro  7.3  /  Alb  3.6  /  TBili  0.3  /  DBili  x   /  AST  14<L>  /  ALT  21  /  AlkPhos  102  10-19      Urinalysis Basic - ( 19 Oct 2021 19:20 )    Color: Yellow / Appearance: Clear / SG: <=1.005 / pH: x  Gluc: x / Ketone: NEGATIVE  / Bili: NEGATIVE / Urobili: 0.2 E.U./dL   Blood: x / Protein: NEGATIVE mg/dL / Nitrite: NEGATIVE   Leuk Esterase: NEGATIVE / RBC: x / WBC x   Sq Epi: x / Non Sq Epi: x / Bacteria: x          Radiology:    < from: CT Head No Cont (10.20.21 @ 03:03) >  EXAM:  CT BRAIN                          PROCEDURE DATE:  10/20/2021          INTERPRETATION:  I, Dmitri Jenkins MD, have reviewed the images and the report and agree with the findings, with the following modifications:    Clinical indication: Fall on Plavix, right parietal hyperdensity, follow-up.    There is a stable curvilinear hyperdensity in the right parietal lobe which appears to be cortical and may represent cortical laminar necrosis. There is no acute intracranial hemorrhage.    ******PRELIMINARY REPORT******          PROCEDURE: CT head without intravenous contrast    CLINICAL INDICATION:    TECHNIQUE:  Multiple axial images were obtained and viewed at 5 mm intervals from the skull base to the vertex. The images were reviewed inbrain and bone windows. Sagittal and coronal reformations are provided.    COMPARISON EXAMINATION: CT of the head from 10/19/2021.    FINDINGS:  VENTRICLES AND SULCI: The ventricles, cisternal spaces, and sulci are consistent with mild parenchymal volume loss. No hydrocephalus. There is ex vacuo dilatation of the frontal horns and third ventricle from central volume loss and infarcts.  INTRA-AXIAL: No intracranial mass, acute hemorrhage, or midline shift is present. No acute transcortical infarction. Left cerebral hemisphere chronic infarct. There is multifocal lacunar infarct to the right corpus stratum and larger site of encephalomalacia in the left putamen that could be from old infarct or hemorrhage. White matter small vessel ischemic changes seen, asymmetric to the left which might be due to Wallerian degeneration from large putamen infarct. No CT evidence of recent transcortical infarct. There is mild overall parenchymal atrophy.  EXTRA-AXIAL: No extra-axial fluid collection is present.  VISUALIZED SINUSES: Paranasal sinuses show nonspecific opacification of the left posterior most ethmoid cell.  VISUALIZED MASTOIDS: Well aerated.  CALVARIUM: No fracture.  MISCELLANEOUS:  None.    IMPRESSION:    1.  Unchanged curvilinear focus of hypodensity of the right parietal lobe.  2.  Multiple chronic infarcts as above.              Vital Signs Last 24 Hrs  T(C): 36.7 (20 Oct 2021 09:16), Max: 37.1 (19 Oct 2021 17:14)  T(F): 98.1 (20 Oct 2021 09:16), Max: 98.7 (19 Oct 2021 17:14)  HR: 84 (20 Oct 2021 09:16) (65 - 96)  BP: 132/78 (20 Oct 2021 09:16) (132/78 - 186/94)  BP(mean): --  RR: 17 (20 Oct 2021 09:16) (17 - 18)  SpO2: 93% (20 Oct 2021 09:16) (91% - 97%)        REVIEW OF SYSTEMS: s/p fall      Physical Exam:  65 yo AA gentleman lying in semi Soto's position, awake, alert, no acute complaints    Neurologic Exam:    Alert and oriented x 2 to person, place,  speech fluent w/o dysarthria, follows commands    Cranial Nerves:     II:                         pupils equal, round and reactive to light, visual fields intact   III/ IV/VI:               extraocular movements intact, neg nystagmus, neg ptosis  V:                        facial sensation intact, V1-3 normal  VII:                      face symmetric, no droop, normal eye closure and smile  VIII:                     hearing intact to finger rub bilaterally  IX and X:             no hoarseness, gag intact, palate/ uvula rise symmetrically  XI:                       SCM/ trapezius strength intact bilateral  XII:                      no tongue deviation    Motor Exam:    Right UE:              no focal weakness > 3+/5                             pronator drift: neg    Left UE:                 no focal weakness > 3+/5                             pronator drift: neg      Right LE:               no focal weakness > 3+/5    Left LE:                  no focal weakness > 3+/5                 Sensation:           intact to light touch x 4 extremities                                              DTR:                  biceps/brachioradialis: equal                                                       patella/ankle: equal                             R:      L:                              neg clonus                           neg Babinski                        Gait:  not tested        PM&R Impression:    1) s/p fall  2) no focal weakness    Recommendations/ Plan :    1) Physical therapy focusing on therapeutic exercises, bed mobility/transfer out of bed evaluation, progressive ambulation with assistive devices prn.    2) Anticipated Disposition Plan/Recs:   pending functional progress

## 2021-10-20 NOTE — PROGRESS NOTE ADULT - SUBJECTIVE AND OBJECTIVE BOX
INTERVAL HPI/OVERNIGHT EVENTS:  Mr. Wong was seen and examined at bedside, resting comfortably, no complaints at this time. Mr. Wong is a poor historian, he endorses going to [employer] to buy a bible and then falling but could not state much else about the fall or his medical history. He is AOx2, oriented to self and place but not date. On ROS, he denied any chest pain, SOB, palpitations, sweating or nausea preceding the fall, and denied these at present as well. He also denied fever, chills, dizziness, weakness, HA, Changes in vision, N/V/D/C, dysuria, changes in bowel movements, LE edema. ROS otherwise negative. He endorsed a mild persistent dry cough. On interview, Mr. Wong demonstrates increased speech latency and poverty of content.     Per collateral this morning (spoke with  Lona Alfonso at [facility] #: [phone]), Mr. Wong is demented at baseline, wears diapers, and carries a dx of schizophrenia; Ms. Alfonso also provided a list of Mr. Wong's active medications which are: Aspirin 81 mg QD, Metformin 500 mg QD, Clopidegrol 75 mg QD, Nifedipine 60 mg QD, atorvastatin 40 mg QD, Albuterol 90 mg inhaled every 6 hrs PRN, Donepizil 10 mg, Vitamin B12.    VITAL SIGNS:  T(F): 98.1 (10-20-21 @ 09:16)  HR: 84 (10-20-21 @ 09:16)  BP: 132/78 (10-20-21 @ 09:16)  RR: 17 (10-20-21 @ 09:16)  SpO2: 93% (10-20-21 @ 09:16)  Wt(kg): --    PHYSICAL EXAM:    Constitutional: Disheveled appearing man not in acute distress, resting comfortably.   HEENT: PERRL, EOMI, sclera non-icteric but injected, neck supple, trachea midline, no masses, no JVD, MM dry, poor dentition  Respiratory: wheezing at bases b/l, no rhonchi, no rales, without accessory muscle use and no intercostal retractions  Cardiovascular: RRR, normal S1S2, no M/R/G  Abdomen: no suprapubic tenderness, soft, NTND, no masses palpable, BS normal  Extremities: Warm, well perfused, pulses equal bilateral upper and lower extremities, no edema, no clubbing  Neurological: AAOx2, CN Grossly intact, decreased position sense on feet bilaterally, 5/5 strength bilaterally and symmetrically   Skin: Normal temperature, warm, dry    MEDICATIONS  (STANDING):  dextrose 40% Gel 15 Gram(s) Oral once  dextrose 5%. 1000 milliLiter(s) (50 mL/Hr) IV Continuous <Continuous>  dextrose 50% Injectable 25 Gram(s) IV Push once  influenza  Vaccine (HIGH DOSE) 0.7 milliLiter(s) IntraMuscular once  insulin lispro (ADMELOG) corrective regimen sliding scale   SubCutaneous three times a day before meals  NIFEdipine XL 60 milliGRAM(s) Oral every 24 hours    MEDICATIONS  (PRN):  acetaminophen   Tablet .. 650 milliGRAM(s) Oral every 6 hours PRN Temp greater or equal to 38C (100.4F), Mild Pain (1 - 3)  albuterol/ipratropium for Nebulization 3 milliLiter(s) Nebulizer every 4 hours PRN bronchospasm OR SpO2<88%  aluminum hydroxide/magnesium hydroxide/simethicone Suspension 30 milliLiter(s) Oral every 4 hours PRN Dyspepsia  LORazepam     Tablet 1 milliGRAM(s) Oral every 1 hour PRN CIWA-Ar score 8 or greater  melatonin 3 milliGRAM(s) Oral at bedtime PRN Insomnia  ondansetron Injectable 4 milliGRAM(s) IV Push every 8 hours PRN Nausea and/or Vomiting      Allergies    No Known Allergies    Intolerances        LABS:                        16.6   8.90  )-----------( 173      ( 20 Oct 2021 08:20 )             54.7     10-20    149<H>  |  115<H>  |  10  ----------------------------<  104<H>  4.2   |  24  |  0.94    Ca    10.2      20 Oct 2021 08:20  Phos  3.3     10-20  Mg     2.2     10-20    TPro  7.3  /  Alb  3.6  /  TBili  0.3  /  DBili  x   /  AST  14<L>  /  ALT  21  /  AlkPhos  102  10-19      Urinalysis Basic - ( 19 Oct 2021 19:20 )    Color: Yellow / Appearance: Clear / SG: <=1.005 / pH: x  Gluc: x / Ketone: NEGATIVE  / Bili: NEGATIVE / Urobili: 0.2 E.U./dL   Blood: x / Protein: NEGATIVE mg/dL / Nitrite: NEGATIVE   Leuk Esterase: NEGATIVE / RBC: x / WBC x   Sq Epi: x / Non Sq Epi: x / Bacteria: x          RADIOLOGY & ADDITIONAL TESTS:  Reviewed

## 2021-10-20 NOTE — CONSULT NOTE ADULT - ASSESSMENT
per Internal Medicine    67 yo  M with PMHx HTN, DM, CVA, prior syphilis/gonorrhea, unspecified psychosis BIBEMS after fall admitted for ongoing evaluation.    Problem/Plan - 1:  ·  Problem: Fall.   ·  Plan: Fall at [place] and patient having difficulty answering historical questions.  Patient reporting R leg weakness prior to fall then acute onset b/l weakness, found in bathroom. Ddx of CVA less likely given two negative CT scans without evidence of acute process, TIA still possible. ACS unlikely since not meeting criteria on EKG, no chest pain, no trop in ED. Negative urine toxicology makes acute intoxication less likely. Other possibilities include: orthostatic hypotension 2/2 dehydration or medications, mechanical fall, or vasovagal fall.   - trend EKG  - f/u AM TSH, a1c, B12  - PT consultation  - med rec completed with pt's home.    Problem/Plan - 2:  ·  Problem: Other encephalopathy.   ·  Plan: Broad differential at this time. Per  at pt's home, pt has dementia at baseline, is in diapers and takes donepezil. Tremor at baseline and shuffling gait make Parkinson's a concern, but decreased proprioception in toes bilaterally suggest a process impacting the dorsal column such as B12 deficiency or neurosyphilis- less likely but still a consideration in this patient with poor health literacy and hx of syphilis.   -outpatient, appointment with neurologist   - not an acute process requiring inpatient attention from psychiatry or neurology.    Problem/Plan - 3:  ·  Problem: History of psychiatric treatment.   ·  Plan: Pt seems to have speech latency and poverty of content, likely negative sx schizophrenia. Endorses hx of hearing voices, not hearing voices at present. Denies any current or prior hx of suicidal ideation. Per [facility] on trazodone 50, olanzipine 20, donepezil 10. Per [facility] collateral via [facility] ED only on clonazepam. Per collateral, only on donepezil     - if patient agitated, prefer olanzipine 10mg disintegrating tabs. Avoid olanzipine IM/IV as cannot give lorazepam concurrently and patient could withdraw from clonazepam  - low risk CIWA due to outpatient benzo use.    Problem/Plan - 4:  ·  Problem: Diabetes mellitus.   ·  Plan: History of DM, insulin on [facility] from earlier this year but pt does not think he takes insulin.  - insulin sliding scale  - am A1C.    Problem/Plan - 5:  ·  Problem: Hypertension.   ·  Plan: Patient says he has high blood pressure but doesn't recall what meds he takes. 138/77 in ED, then 176/70 on RMF, and currently remains elevated, last /73 and then 132/78 most recently.   - Added Nifedpine 60.   - temporize with labetalol  - orthostatic vitals when working with PT today.    Problem/Plan - 6:  ·  Problem: Tobacco use disorder.   ·  Plan: Active smoker since childhood, did not desire counselling on tobacco cessation overnight. Likely component of reactive airway disease or COPD but pt does not think he has a formal diagnosis of it. He does recall taking inhaled medications but not every day.  - offer nicotine replacement  - offered counselling   - duonebs prn for wheezing or desaturations  - tolerate SpO2 >88% given concern for underlying COPD  - AM CXR.    Problem/Plan - 7:  ·  Problem: Prophylactic measure.   ·  Plan: F: none  E: replete K<4.0, Mg<2.0, Phos<2.5  N: soft/CC  DVT prophylaxis: pending repeat CTH  Access: PIV  Code Status: full.    Problem/Plan - 8:  ·  Problem: R/O CVA (cerebrovascular accident).   ·  Plan: See fall plan above  - repeat CTH  - PT.

## 2021-10-20 NOTE — DISCHARGE NOTE PROVIDER - HOSPITAL COURSE
#Discharge: do not delete    Patient is 65 yo M/F with past medical history of   Presented with fall episode at [facility] stating that morning of admission he had right leg weakness and then had sudden bilateral leg weakness and as per patient his "legs gave out" and CT head non-contrast negative x2.    Problem List/Main Diagnoses (system-based):   #Fall  Fall at [facility] and patient having difficulty answering historical questions.  Patient reporting R leg weakness prior to fall then acute onset b/l weakness, found in bathroom. Ddx of CVA less likely given two negative CT scans without evidence of acute process, TIA still possible. ACS unlikely since not meeting criteria on EKG, no chest pain, no trop in ED. Negative urine toxicology makes acute intoxication less likely. Other possibilities include: orthostatic hypotension 2/2 dehydration or medications, mechanical fall, or vasovagal fall  -f/u neurologist outpatient    #Encephalopathy  Per patient's , pt has dementia at baseline, is in diapers and takes donepezil  -c/w Donepezil 10mg qd  -f/u outpatient neurologist    #History of schizophrenia  Spoke with  Lona Hamilton at [facility] #: [phone]), Mr. Wong is demented at baseline, wears diapers, and carries a dx of schizophrenia; Pt seems to have speech latency and poverty of content, likely negative sx schizophrenia. Endorses hx of hearing voices, not hearing voices at present. Denies any current or prior hx of suicidal ideation. Per [employer] on trazodone 50, olanzipine 20, donepezil 10. Per [facility] via [facility] ED only on clonazepam.   -Outpatient medication olanzapine 10mg disintegrating tabs    #DM  History of DM, insulin on [employer] from earlier this year but pt does not think he takes insulin. A1c 6.1%  -Dietary modification and c/w home metformin 500mg qd    #HTN  Patient says he has high blood pressure but doesn't recall what meds he takes. 138/77 in ED, then 176/70 on RMF with improvement  -c/w home nifedipine 60mg    #History of CVA  CT head non-contrast (10/19): No definitive intracranial hemorrhage or calvarial fracture. Small curvilinear focus of high density at the right parietal lobe is likely cortical in location, possibly mineralized from old infarct with configuration less compatible with subarachnoid hemorrhage. If there is high suspicion for intracranial bleed of the patient is anticoagulated, consider repeat examination in 4-6 hours to assure short stability.  CT head non-contrast (10/20): Unchanged curvilinear focus of hypodensity of the right parietal lobe. Multiple chronic infarcts.  -c/w home ASA 81, Plavix 75, Atorvastatin 40mg    #Tobacco use disorder  Active smoker since childhood, did not desire counselling on tobacco cessation      Inpatient treatment course: None  New medications:   Labs to be followed outpatient:   Exam to be followed outpatient:    #Discharge: do not delete    Patient is 65 yo M/F with past medical history of   Presented with fall episode at [facility] stating that morning of admission he had right leg weakness and then had sudden bilateral leg weakness and as per patient his "legs gave out" and CT head non-contrast negative x2.    Problem List/Main Diagnoses (system-based):   #Fall  Fall at [facility] and patient having difficulty answering historical questions.  Patient reporting R leg weakness prior to fall then acute onset b/l weakness, found in bathroom. Ddx of CVA less likely given two negative CT scans without evidence of acute process, TIA still possible. ACS unlikely since not meeting criteria on EKG, no chest pain, no trop in ED. Negative urine toxicology makes acute intoxication less likely. Other possibilities include: orthostatic hypotension 2/2 dehydration or medications, mechanical fall, or vasovagal fall  -f/u neurologist outpatient    #Encephalopathy  Per patient's , pt has dementia at baseline, is in diapers and takes donepezil  -c/w Donepezil 10mg qd  -f/u outpatient neurologist    #History of schizophrenia  Spoke with  Lona Hamilton at [facility] #: [phone]), Mr. Wong is demented at baseline, wears diapers, and carries a dx of schizophrenia; Pt seems to have speech latency and poverty of content, likely negative sx schizophrenia. Endorses hx of hearing voices, not hearing voices at present. Denies any current or prior hx of suicidal ideation. Per [employer] on trazodone 50, olanzipine 20, donepezil 10. Per [employer] collateral via [facility] ED only on clonazepam.   -Outpatient medication olanzapine 10mg disintegrating tabs    #DM  History of DM, insulin on [employer] from earlier this year but pt does not think he takes insulin. A1c 6.1%  -Dietary modification and c/w home metformin 500mg qd    #HTN  Patient says he has high blood pressure but doesn't recall what meds he takes. 138/77 in ED, then 176/70 on RMF with improvement  -c/w home nifedipine 60mg    #History of CVA  CT head non-contrast (10/19): No definitive intracranial hemorrhage or calvarial fracture. Small curvilinear focus of high density at the right parietal lobe is likely cortical in location, possibly mineralized from old infarct with configuration less compatible with subarachnoid hemorrhage. If there is high suspicion for intracranial bleed of the patient is anticoagulated, consider repeat examination in 4-6 hours to assure short stability.  CT head non-contrast (10/20): Unchanged curvilinear focus of hypodensity of the right parietal lobe. Multiple chronic infarcts.  -c/w home ASA 81, Plavix 75, Atorvastatin 40mg    #Tobacco use disorder  Active smoker since childhood, did not desire counselling on tobacco cessation      Inpatient treatment course: None  New medications: None (briefly used coreg for blood pressure prior to obtaining med rec but has since d/c)  Labs to be followed outpatient: Hb/Hct  Exam to be followed outpatient: Follow up with primary care physician to repeat CBC and trend Hb/Hct   #Discharge: do not delete    Patient is 67 yo M/F with past medical history of   Presented with fall episode at [facility] stating that morning of admission he had right leg weakness and then had sudden bilateral leg weakness and as per patient his "legs gave out" and CT head non-contrast negative x2.    Problem List/Main Diagnoses (system-based):   #Fall  Fall at [facility] and patient having difficulty answering historical questions.  Patient reporting R leg weakness prior to fall then acute onset b/l weakness, found in bathroom. Ddx of CVA less likely given two negative CT scans without evidence of acute process, TIA still possible. ACS unlikely since not meeting criteria on EKG, no chest pain, no trop in ED. Negative urine toxicology makes acute intoxication less likely. Other possibilities include: orthostatic hypotension 2/2 dehydration or medications, mechanical fall, or vasovagal fall  -f/u neurologist outpatient    #Encephalopathy  Per patient's , pt has dementia at baseline, is in diapers and takes donepezil  -c/w Donepezil 10mg qd    #History of schizophrenia  Spoke with  Lona Hamilton at [facility] #: [phone]), Mr. Wong is demented at baseline, wears diapers, and carries a dx of schizophrenia; Pt seems to have speech latency and poverty of content, likely negative sx schizophrenia. Endorses hx of hearing voices, not hearing voices at present. Denies any current or prior hx of suicidal ideation. Per [employer] on trazodone 50, olanzipine 20, donepezil 10. Per [facility] via [facility] ED only on clonazepam.   -Outpatient medication olanzapine 10mg disintegrating tabs will restart and patient will follow up with PCP    #DM  History of DM, insulin on [employer] from earlier this year but pt does not think he takes insulin. A1c 6.1%  -Dietary modification and c/w home metformin 500mg qd    #HTN  Patient says he has high blood pressure but doesn't recall what meds he takes. 138/77 in ED, then 176/70 on RMF with improvement  -c/w home nifedipine 60mg    #History of CVA  CT head non-contrast (10/19): No definitive intracranial hemorrhage or calvarial fracture. Small curvilinear focus of high density at the right parietal lobe is likely cortical in location, possibly mineralized from old infarct with configuration less compatible with subarachnoid hemorrhage. If there is high suspicion for intracranial bleed of the patient is anticoagulated, consider repeat examination in 4-6 hours to assure short stability.  CT head non-contrast (10/20): Unchanged curvilinear focus of hypodensity of the right parietal lobe. Multiple chronic infarcts.  -c/w home ASA 81, Plavix 75, Atorvastatin 40mg    #Tobacco use disorder  Active smoker since childhood, did not desire counselling on tobacco cessation      Inpatient treatment course: None  New medications: None (briefly used coreg for blood pressure prior to obtaining med rec but has since d/c)  Labs to be followed outpatient: Hb/Hct  Exam to be followed outpatient: Follow up with primary care physician to repeat CBC and trend Hb/Hct

## 2021-10-20 NOTE — DISCHARGE NOTE PROVIDER - PROVIDER TOKENS
FREE:[LAST:[Alexsandra],FIRST:[Lliy],PHONE:[phone]],FAX:[(   [phone]    -],ADDRESS:[[address] [place] [address]]]

## 2021-10-20 NOTE — PROGRESS NOTE ADULT - PROBLEM SELECTOR PLAN 6
Active smoker since childhood, did not desire counselling on tobacco cessation overnight. Likely component of reactive airway disease or COPD but pt does not think he has a formal diagnosis of it. He does recall taking inhaled medications but not every day.  - offer nicotine replacement  - offered counselling   - bernabe prn for wheezing or desaturations  - tolerate SpO2 >88% given concern for underlying COPD  - AM CXR CT head non-contrast (10/19): No definitive intracranial hemorrhage or calvarial fracture. Small curvilinear focus of high density at the right parietal lobe is likely cortical in location, possibly mineralized from old infarct with configuration less compatible with subarachnoid hemorrhage. If there is high suspicion for intracranial bleed of the patient is anticoagulated, consider repeat examination in 4-6 hours to assure short stability.  CT head non-contrast (10/20): Unchanged curvilinear focus of hypodensity of the right parietal lobe. Multiple chronic infarcts.  -Restarted home ASA 81, Plavix 75, Atorvastatin 40mg  -PT rec home with home PT

## 2021-10-20 NOTE — PROGRESS NOTE ADULT - PROBLEM SELECTOR PLAN 4
History of DM, insulin on [employer] from earlier this year but pt does not think he takes insulin.  - insulin sliding scale  - am A1C History of DM, insulin on [employer] from earlier this year but pt does not think he takes insulin.  - insulin sliding scale  - A1c 6.1%

## 2021-10-20 NOTE — DISCHARGE NOTE PROVIDER - NSDCFUADDAPPT_GEN_ALL_CORE_FT
Your  Lona will assist you in scheduling an appointment with your primary care physician Dr. Lily Upton for further evaluation and management. Please get your blood work drawn to continue monitoring your hemoglobin as it was mildly elevated.

## 2021-10-20 NOTE — DISCHARGE NOTE PROVIDER - NSDCMRMEDTOKEN_GEN_ALL_CORE_FT
albuterol 90 mcg/inh inhalation aerosol: 2 puff(s) inhaled every 6 hours  aspirin 81 mg oral tablet, chewable: 1 tab(s) orally once a day  atorvastatin 40 mg oral tablet: 1 tab(s) orally once a day  clopidogrel 75 mg oral tablet: 1 tab(s) orally once a day  donepezil 10 mg oral tablet: 1 tab(s) orally once a day (at bedtime)  metFORMIN 500 mg oral tablet: 1 tab(s) orally once a day  NIFEdipine 60 mg oral tablet, extended release: 1 tab(s) orally once a day  Vitamin B12 1000 mcg oral tablet: 1 tab(s) orally once a day   albuterol 90 mcg/inh inhalation aerosol: 2 puff(s) inhaled every 6 hours  aspirin 81 mg oral tablet, chewable: 1 tab(s) orally once a day  atorvastatin 40 mg oral tablet: 1 tab(s) orally once a day  clopidogrel 75 mg oral tablet: 1 tab(s) orally once a day  donepezil 10 mg oral tablet: 1 tab(s) orally once a day (at bedtime)  metFORMIN 500 mg oral tablet: 1 tab(s) orally once a day  NIFEdipine 60 mg oral tablet, extended release: 1 tab(s) orally once a day  OLANZapine 20 mg oral tablet, disintegratin tab(s) orally once a day  Vitamin B12 1000 mcg oral tablet: 1 tab(s) orally once a day

## 2021-10-20 NOTE — DISCHARGE NOTE PROVIDER - NSDCCPCAREPLAN_GEN_ALL_CORE_FT
PRINCIPAL DISCHARGE DIAGNOSIS  Diagnosis: Fall  Assessment and Plan of Treatment: Fall prevention includes ways to make your home and other areas safer. It also includes ways you can move more carefully to prevent a fall. Health conditions that cause changes in your blood pressure, vision, or muscle strength and coordination may increase your risk for falls. Medicines may also increase your risk for falls if they make you dizzy, weak, or sleepy.  Seek Medical Attention If:  You have fallen and are unconscious.  fallen and cannot move part of your body.  You have fallen and have pain or a headache.  Fall prevention tips:  Stand or sit up slowly.  Use assistive devices as directed.   You may need to have grab bars put in your bathroom near the toilet or in the shower.  Wear shoes that fit well and have soles that . Wear shoes both inside and outside. Do not wear shoes with high heels.  Wear a personal alarm that can call 911 in an emergency.   Manage your medical conditions. Keep all appointments with your healthcare providers. Visit your eye doctor as directed.  Home safety tips:   Put nonslip strips on your bath or shower floor to prevent you from   Use a shower seat so you do not need to stand while you shower. Sit on the toilet or a chair in your bathroom to dry yourself and put on clothing. This will prevent you from losing your balance from drying or dressing yourself while you are standing.  Keep paths clear. Remove books, shoes, and other objects from walkways and stairs. Place cords for telephones and lamps out of the way so that you do not need to walk over them. Tape them down if you cannot move them. Remove small rugs or secure it with double-sided tape to prevent you from   Install bright lights in your home. Use night lights to help light paths to the bathroom or kitchen. Always turn on the light before you start walking.  Keep items you use often on shelves within reach. Do not use a step stool to help you reach an item.  Place reflective tape on the edges of your stairs. To see better.         PRINCIPAL DISCHARGE DIAGNOSIS  Diagnosis: Fall  Assessment and Plan of Treatment: Please follow up with your primary care physician for continued evaluation and management.  Fall prevention includes ways to make your home and other areas safer. It also includes ways you can move more carefully to prevent a fall. Health conditions that cause changes in your blood pressure, vision, or muscle strength and coordination may increase your risk for falls. Medicines may also increase your risk for falls if they make you dizzy, weak, or sleepy.  Seek Medical Attention If:  You have fallen and are unconscious.  fallen and cannot move part of your body.  You have fallen and have pain or a headache.  Fall prevention tips:  Stand or sit up slowly.  Use assistive devices as directed.   You may need to have grab bars put in your bathroom near the toilet or in the shower.  Wear shoes that fit well and have soles that . Wear shoes both inside and outside. Do not wear shoes with high heels.  Wear a personal alarm that can call 911 in an emergency.   Manage your medical conditions. Keep all appointments with your healthcare providers. Visit your eye doctor as directed.  Home safety tips:   Put nonslip strips on your bath or shower floor to prevent you from   Use a shower seat so you do not need to stand while you shower. Sit on the toilet or a chair in your bathroom to dry yourself and put on clothing. This will prevent you from losing your balance from drying or dressing yourself while you are standing.  Keep paths clear. Remove books, shoes, and other objects from walkways and stairs. Place cords for telephones and lamps out of the way so that you do not need to walk over them. Tape them down if you cannot move them. Remove small rugs or secure it with double-sided tape to prevent you from   Install bright lights in your home. Use night lights to help light paths to the bathroom or kitchen. Always turn on the light before you start walking.  Keep items you use often on shelves within reach. Do not use a step stool to help you reach an item.         PRINCIPAL DISCHARGE DIAGNOSIS  Diagnosis: Fall  Assessment and Plan of Treatment: Please follow up with your primary care physician to repeat your blood work to monitor your hemoglobin level as it was mildly elevated during your admission.  Fall prevention includes ways to make your home and other areas safer. It also includes ways you can move more carefully to prevent a fall. Health conditions that cause changes in your blood pressure, vision, or muscle strength and coordination may increase your risk for falls. Medicines may also increase your risk for falls if they make you dizzy, weak, or sleepy.  Seek Medical Attention If:  You have fallen and are unconscious.  fallen and cannot move part of your body.  You have fallen and have pain or a headache.  Fall prevention tips:  Stand or sit up slowly.  Use assistive devices as directed.   You may need to have grab bars put in your bathroom near the toilet or in the shower.  Wear shoes that fit well and have soles that . Wear shoes both inside and outside. Do not wear shoes with high heels.  Wear a personal alarm that can call 911 in an emergency.   Manage your medical conditions. Keep all appointments with your healthcare providers. Visit your eye doctor as directed.  Home safety tips:   Put nonslip strips on your bath or shower floor to prevent you from   Use a shower seat so you do not need to stand while you shower. Sit on the toilet or a chair in your bathroom to dry yourself and put on clothing. This will prevent you from losing your balance from drying or dressing yourself while you are standing.  Keep paths clear. Remove books, shoes, and other objects from walkways and stairs. Place cords for telephones and lamps out of the way so that you do not need to walk over them. Tape them down if you cannot move them. Remove small rugs or secure it with double-sided tape to prevent you from   Install bright lights in your home. Use night lights to help light paths to the bathroom or kitchen. Always turn on the light before you start walking.

## 2021-10-20 NOTE — PROGRESS NOTE ADULT - PROBLEM SELECTOR PLAN 1
Fall at [facility] and patient having difficulty answering historical questions.  Patient reporting R leg weakness prior to fall then acute onset b/l weakness, found in bathroom. Ddx of CVA less likely given two negative CT scans without evidence of acute process, TIA still possible. ACS unlikely since not meeting criteria on EKG, no chest pain, no trop in ED. Negative urine toxicology makes acute intoxication less likely. Other possibilities include: orthostatic hypotension 2/2 dehydration or medications, mechanical fall, or vasovagal fall.   - trend EKG  - f/u AM TSH, a1c, B12  - PT consultation  - med rec completed with pt's home Fall at [facility] and patient having difficulty answering historical questions.  Patient reporting R leg weakness prior to fall then acute onset b/l weakness, found in bathroom. Ddx of CVA less likely given two negative CT scans without evidence of acute process, TIA still possible. ACS unlikely since not meeting criteria on EKG, no chest pain, no trop in ED. Negative urine toxicology makes acute intoxication less likely. Other possibilities include: orthostatic hypotension 2/2 dehydration or medications, mechanical fall, or vasovagal fall.   - trend EKG  -TSH wnl, a1c 6.1%, B12 1591  - PT consultation  - med rec completed with pt's home Fall at [facility] and patient having difficulty answering historical questions.  Patient reporting R leg weakness prior to fall then acute onset b/l weakness, found in bathroom. Ddx of CVA less likely given two negative CT scans without evidence of acute process, TIA still possible. ACS unlikely since not meeting criteria on EKG, no chest pain, no trop in ED. Negative urine toxicology makes acute intoxication less likely. Other possibilities include: orthostatic hypotension 2/2 dehydration or medications, mechanical fall, or vasovagal fall.   -trend EKG  -TSH wnl, a1c 6.1%, B12 1591  -PT consultation  -med rec completed with pt's home

## 2021-10-20 NOTE — PROGRESS NOTE ADULT - PROBLEM SELECTOR PLAN 5
Patient says he has high blood pressure but doesn't recall what meds he takes. 138/77 in ED, then 176/70 on RMF, and currently remains elevated, last /73 and then 132/78 most recently.   - Added Nifedpine 60.   - temporize with labetalol  - orthostatic vitals when working with PT today. Patient says he has high blood pressure but doesn't recall what meds he takes. 138/77 in ED, then 176/70 on RMF, and currently remains elevated, last /73 and then 132/78 most recently.   -Added Nifedpine 60.   -temporize with labetalol  -orthostatic vitals when working with PT today.

## 2021-10-20 NOTE — PROGRESS NOTE ADULT - PROBLEM SELECTOR PLAN 2
Broad differential at this time. Per  at pt's home, pt has dementia at baseline, is in diapers and takes donepezil. Tremor at baseline and shuffling gait make Parkinson's a concern, but decreased proprioception in toes bilaterally suggest a process impacting the dorsal column such as B12 deficiency or neurosyphilis- less likely but still a consideration in this patient with poor health literacy and hx of syphilis.   -outpatient, appointment with neurologist   - not an acute process requiring inpatient attention from psychiatry or neurology Broad differential at this time. Per  at pt's home, pt has dementia at baseline, is in diapers and takes donepezil. Tremor at baseline and shuffling gait make Parkinson's a concern, but decreased proprioception in toes bilaterally suggest a process impacting the dorsal column such as B12 deficiency or neurosyphilis- less likely but still a consideration in this patient with poor health literacy and hx of syphilis.   -Restarted Donepizil 10mg  -outpatient, appointment with neurologist   - not an acute process requiring inpatient attention from psychiatry or neurology Broad differential at this time. Per  at pt's home, pt has dementia at baseline, is in diapers and takes donepezil. Tremor at baseline and shuffling gait make Parkinson's a concern, but decreased proprioception in toes bilaterally suggest a process impacting the dorsal column such as B12 deficiency or neurosyphilis- less likely but still a consideration in this patient with poor health literacy and hx of syphilis.   -Restarted Donepizil 10mg  -outpatient, appointment with neurologist   -not an acute process requiring inpatient attention from psychiatry or neurology

## 2021-10-20 NOTE — PROGRESS NOTE ADULT - PROBLEM SELECTOR PLAN 8
See fall plan above  - repeat CTH  - PT History of CVA  Restarted home ASA 81, Plavix 75, Atorvastatin 40mg  - repeat CTH  - PT F: none  E: replete K<4.0, Mg<2.0, Phos<2.5  N: soft/CC  DVT prophylaxis: pending repeat CTH  Access: PIV  Code Status: full

## 2021-10-20 NOTE — PROGRESS NOTE ADULT - PROBLEM SELECTOR PLAN 7
F: none  E: replete K<4.0, Mg<2.0, Phos<2.5  N: soft/CC  DVT prophylaxis: pending repeat CTH  Access: PIV  Code Status: full Active smoker since childhood, did not desire counselling on tobacco cessation overnight. Likely component of reactive airway disease or COPD but pt does not think he has a formal diagnosis of it. He does recall taking inhaled medications but not every day.  - offer nicotine replacement  - offered counselling   - bernabe prn for wheezing or desaturations  - tolerate SpO2 >88% given concern for underlying COPD  - AM CXR

## 2021-10-21 VITALS
OXYGEN SATURATION: 95 % | RESPIRATION RATE: 17 BRPM | TEMPERATURE: 98 F | HEART RATE: 87 BPM | SYSTOLIC BLOOD PRESSURE: 142 MMHG | DIASTOLIC BLOOD PRESSURE: 68 MMHG

## 2021-10-21 LAB
ANION GAP SERPL CALC-SCNC: 11 MMOL/L — SIGNIFICANT CHANGE UP (ref 5–17)
BUN SERPL-MCNC: 15 MG/DL — SIGNIFICANT CHANGE UP (ref 7–23)
CALCIUM SERPL-MCNC: 10.5 MG/DL — SIGNIFICANT CHANGE UP (ref 8.4–10.5)
CHLORIDE SERPL-SCNC: 112 MMOL/L — HIGH (ref 96–108)
CO2 SERPL-SCNC: 26 MMOL/L — SIGNIFICANT CHANGE UP (ref 22–31)
CREAT SERPL-MCNC: 0.99 MG/DL — SIGNIFICANT CHANGE UP (ref 0.5–1.3)
GLUCOSE BLDC GLUCOMTR-MCNC: 112 MG/DL — HIGH (ref 70–99)
GLUCOSE BLDC GLUCOMTR-MCNC: 90 MG/DL — SIGNIFICANT CHANGE UP (ref 70–99)
GLUCOSE SERPL-MCNC: 104 MG/DL — HIGH (ref 70–99)
HCT VFR BLD CALC: 60.8 % — CRITICAL HIGH (ref 39–50)
HGB BLD-MCNC: 18.4 G/DL — HIGH (ref 13–17)
MAGNESIUM SERPL-MCNC: 2.2 MG/DL — SIGNIFICANT CHANGE UP (ref 1.6–2.6)
MCHC RBC-ENTMCNC: 26.6 PG — LOW (ref 27–34)
MCHC RBC-ENTMCNC: 30.3 GM/DL — LOW (ref 32–36)
MCV RBC AUTO: 87.9 FL — SIGNIFICANT CHANGE UP (ref 80–100)
NRBC # BLD: 0 /100 WBCS — SIGNIFICANT CHANGE UP (ref 0–0)
PHOSPHATE SERPL-MCNC: 4 MG/DL — SIGNIFICANT CHANGE UP (ref 2.5–4.5)
PLATELET # BLD AUTO: 158 K/UL — SIGNIFICANT CHANGE UP (ref 150–400)
POTASSIUM SERPL-MCNC: 4.4 MMOL/L — SIGNIFICANT CHANGE UP (ref 3.5–5.3)
POTASSIUM SERPL-SCNC: 4.4 MMOL/L — SIGNIFICANT CHANGE UP (ref 3.5–5.3)
RBC # BLD: 6.92 M/UL — HIGH (ref 4.2–5.8)
RBC # FLD: 16.1 % — HIGH (ref 10.3–14.5)
SODIUM SERPL-SCNC: 149 MMOL/L — HIGH (ref 135–145)
WBC # BLD: 9.46 K/UL — SIGNIFICANT CHANGE UP (ref 3.8–10.5)
WBC # FLD AUTO: 9.46 K/UL — SIGNIFICANT CHANGE UP (ref 3.8–10.5)

## 2021-10-21 PROCEDURE — 99239 HOSP IP/OBS DSCHRG MGMT >30: CPT

## 2021-10-21 RX ORDER — PREGABALIN 225 MG/1
1 CAPSULE ORAL
Qty: 0 | Refills: 0 | DISCHARGE

## 2021-10-21 RX ORDER — ATORVASTATIN CALCIUM 80 MG/1
1 TABLET, FILM COATED ORAL
Qty: 0 | Refills: 0 | DISCHARGE

## 2021-10-21 RX ORDER — OLANZAPINE 15 MG/1
1 TABLET, FILM COATED ORAL
Qty: 0 | Refills: 0 | DISCHARGE

## 2021-10-21 RX ORDER — DONEPEZIL HYDROCHLORIDE 10 MG/1
1 TABLET, FILM COATED ORAL
Qty: 0 | Refills: 0 | DISCHARGE

## 2021-10-21 RX ORDER — CLOPIDOGREL BISULFATE 75 MG/1
1 TABLET, FILM COATED ORAL
Qty: 0 | Refills: 0 | DISCHARGE

## 2021-10-21 RX ORDER — ALBUTEROL 90 UG/1
2 AEROSOL, METERED ORAL
Qty: 0 | Refills: 0 | DISCHARGE

## 2021-10-21 RX ORDER — ASPIRIN/CALCIUM CARB/MAGNESIUM 324 MG
1 TABLET ORAL
Qty: 0 | Refills: 0 | DISCHARGE

## 2021-10-21 RX ORDER — SODIUM CHLORIDE 9 MG/ML
1000 INJECTION INTRAMUSCULAR; INTRAVENOUS; SUBCUTANEOUS
Refills: 0 | Status: DISCONTINUED | OUTPATIENT
Start: 2021-10-21 | End: 2021-10-21

## 2021-10-21 RX ORDER — METFORMIN HYDROCHLORIDE 850 MG/1
1 TABLET ORAL
Qty: 0 | Refills: 0 | DISCHARGE

## 2021-10-21 RX ORDER — NIFEDIPINE 30 MG
1 TABLET, EXTENDED RELEASE 24 HR ORAL
Qty: 0 | Refills: 0 | DISCHARGE

## 2021-10-21 RX ADMIN — Medication 60 MILLIGRAM(S): at 12:39

## 2021-10-21 NOTE — DISCHARGE NOTE NURSING/CASE MANAGEMENT/SOCIAL WORK - PATIENT PORTAL LINK FT
You can access the FollowMyHealth Patient Portal offered by [facility] by registering at the following website: [email or web address]. By joining [employer]’s FollowMyHealth portal, you will also be able to view your health information using other applications (apps) compatible with our system.

## 2021-10-21 NOTE — DISCHARGE NOTE NURSING/CASE MANAGEMENT/SOCIAL WORK - NSDCPEEMAIL_GEN_ALL_CORE
[facility] for Tobacco Control email [email or web address]

## 2021-10-21 NOTE — DISCHARGE NOTE NURSING/CASE MANAGEMENT/SOCIAL WORK - NSDCPEWEB_GEN_ALL_CORE
[facility] for Tobacco Control website --- [email or web address] website --- [email or web address]

## 2021-10-27 DIAGNOSIS — Z79.899 OTHER LONG TERM (CURRENT) DRUG THERAPY: ICD-10-CM

## 2021-10-27 DIAGNOSIS — G45.9 TRANSIENT CEREBRAL ISCHEMIC ATTACK, UNSPECIFIED: ICD-10-CM

## 2021-10-27 DIAGNOSIS — Z79.84 LONG TERM (CURRENT) USE OF ORAL HYPOGLYCEMIC DRUGS: ICD-10-CM

## 2021-10-27 DIAGNOSIS — E11.9 TYPE 2 DIABETES MELLITUS WITHOUT COMPLICATIONS: ICD-10-CM

## 2021-10-27 DIAGNOSIS — F17.210 NICOTINE DEPENDENCE, CIGARETTES, UNCOMPLICATED: ICD-10-CM

## 2021-10-27 DIAGNOSIS — I10 ESSENTIAL (PRIMARY) HYPERTENSION: ICD-10-CM

## 2021-10-27 DIAGNOSIS — F03.90 UNSPECIFIED DEMENTIA, UNSPECIFIED SEVERITY, WITHOUT BEHAVIORAL DISTURBANCE, PSYCHOTIC DISTURBANCE, MOOD DISTURBANCE, AND ANXIETY: ICD-10-CM

## 2021-10-27 DIAGNOSIS — G93.40 ENCEPHALOPATHY, UNSPECIFIED: ICD-10-CM

## 2021-10-27 DIAGNOSIS — F20.9 SCHIZOPHRENIA, UNSPECIFIED: ICD-10-CM

## 2021-12-22 PROCEDURE — 84443 ASSAY THYROID STIM HORMONE: CPT

## 2021-12-22 PROCEDURE — 86769 SARS-COV-2 COVID-19 ANTIBODY: CPT

## 2021-12-22 PROCEDURE — 97161 PT EVAL LOW COMPLEX 20 MIN: CPT

## 2021-12-22 PROCEDURE — 80307 DRUG TEST PRSMV CHEM ANLYZR: CPT

## 2021-12-22 PROCEDURE — 82607 VITAMIN B-12: CPT

## 2021-12-22 PROCEDURE — 87389 HIV-1 AG W/HIV-1&-2 AB AG IA: CPT

## 2021-12-22 PROCEDURE — 81003 URINALYSIS AUTO W/O SCOPE: CPT

## 2021-12-22 PROCEDURE — 83036 HEMOGLOBIN GLYCOSYLATED A1C: CPT

## 2021-12-22 PROCEDURE — 82962 GLUCOSE BLOOD TEST: CPT

## 2021-12-22 PROCEDURE — 71045 X-RAY EXAM CHEST 1 VIEW: CPT

## 2021-12-22 PROCEDURE — 36415 COLL VENOUS BLD VENIPUNCTURE: CPT

## 2021-12-22 PROCEDURE — 80048 BASIC METABOLIC PNL TOTAL CA: CPT

## 2021-12-22 PROCEDURE — 86901 BLOOD TYPING SEROLOGIC RH(D): CPT

## 2021-12-22 PROCEDURE — 86850 RBC ANTIBODY SCREEN: CPT

## 2021-12-22 PROCEDURE — 86900 BLOOD TYPING SEROLOGIC ABO: CPT

## 2021-12-22 PROCEDURE — 70450 CT HEAD/BRAIN W/O DYE: CPT

## 2021-12-22 PROCEDURE — 85025 COMPLETE CBC W/AUTO DIFF WBC: CPT

## 2021-12-22 PROCEDURE — 83735 ASSAY OF MAGNESIUM: CPT

## 2021-12-22 PROCEDURE — 80053 COMPREHEN METABOLIC PANEL: CPT

## 2021-12-22 PROCEDURE — 86803 HEPATITIS C AB TEST: CPT

## 2021-12-22 PROCEDURE — 87635 SARS-COV-2 COVID-19 AMP PRB: CPT

## 2021-12-22 PROCEDURE — 99285 EMERGENCY DEPT VISIT HI MDM: CPT

## 2021-12-22 PROCEDURE — 82550 ASSAY OF CK (CPK): CPT

## 2021-12-22 PROCEDURE — 93005 ELECTROCARDIOGRAM TRACING: CPT

## 2021-12-22 PROCEDURE — 85027 COMPLETE CBC AUTOMATED: CPT

## 2021-12-22 PROCEDURE — 84100 ASSAY OF PHOSPHORUS: CPT

## 2023-01-15 NOTE — ED PROVIDER NOTE - NS ED MD DISPO SPECIAL CONSIDERATION1
- will need repeat Remicade infusion in 2 weeks as outpt  , has appointment with Dr. Souza Thrusday 1/19 at [address] [place] [address] at 3:30 PM    Follow up with PCP as needed 
Low Suspicion of COVID-19

## 2024-07-11 NOTE — PHYSICAL THERAPY INITIAL EVALUATION ADULT - WEIGHT-BEARING RESTRICTIONS: STAND/SIT, REHAB EVAL
Team Note Due:  Wednesday    Assessment/Intervention/Current Symtoms and Care Coordination:  Chart review and met with team, discussed pt progress, symptomology, and response to treatment.  Discussed the discharge plan and any potential impediments to discharge.    - Per nursing report, pt presented as irritable and belligerent this morning. Reported he was upset with getting his blood drawn. Pt was observed to be in his room until lunch. Pt doesn't really interact with others. Pt expressed interest in meeting with unit therapist. Per provider, pt will remain here for a few days to address withdrawal and irritability and SI.      Discharge Plan or Goal:  To return to [facility]- stated in DEC assessment that he may return, CTC will confirm.     Barriers to Discharge:  Patient requires further psychiatric stabilization due to current symptomology and medication management with possible adjustments    Referral Status:  Pt reports interest in therapy     Legal Status:  Voluntary    Contacts:  [facility], [place]: [phone]  No NEERAJ needed as pt was living there prior to admission.    NEERAJ: Gabriela Weston (daughter): [phone]   *NEERAJ signed on 7/9/2024 and there is electronic copy in chart review under media tab.    NEERAJ: Ada Quispe/[facility] : [phone]     Upcoming Meetings and Dates/Important Information and next steps:  Contact assisted living  Contact Ada      weight-bearing as tolerated